# Patient Record
Sex: FEMALE | Race: WHITE | NOT HISPANIC OR LATINO | URBAN - METROPOLITAN AREA
[De-identification: names, ages, dates, MRNs, and addresses within clinical notes are randomized per-mention and may not be internally consistent; named-entity substitution may affect disease eponyms.]

---

## 2024-08-30 ENCOUNTER — OFFICE VISIT (OUTPATIENT)
Dept: PRIMARY CARE | Facility: CLINIC | Age: 29
End: 2024-08-30
Payer: COMMERCIAL

## 2024-08-30 VITALS
BODY MASS INDEX: 24.8 KG/M2 | OXYGEN SATURATION: 98 % | HEART RATE: 95 BPM | SYSTOLIC BLOOD PRESSURE: 124 MMHG | DIASTOLIC BLOOD PRESSURE: 74 MMHG | WEIGHT: 158 LBS | HEIGHT: 67 IN

## 2024-08-30 DIAGNOSIS — R79.89 ABNORMAL TSH: ICD-10-CM

## 2024-08-30 DIAGNOSIS — Z11.4 SCREENING FOR HIV (HUMAN IMMUNODEFICIENCY VIRUS): ICD-10-CM

## 2024-08-30 DIAGNOSIS — B00.1 RECURRENT COLD SORES: ICD-10-CM

## 2024-08-30 DIAGNOSIS — Z00.00 ANNUAL PHYSICAL EXAM: ICD-10-CM

## 2024-08-30 DIAGNOSIS — Z11.59 ENCOUNTER FOR HEPATITIS C SCREENING TEST FOR LOW RISK PATIENT: ICD-10-CM

## 2024-08-30 DIAGNOSIS — Z76.89 ENCOUNTER TO ESTABLISH CARE: Primary | ICD-10-CM

## 2024-08-30 DIAGNOSIS — Z13.1 SCREENING FOR DIABETES MELLITUS: ICD-10-CM

## 2024-08-30 DIAGNOSIS — Z13.220 SCREENING FOR LIPID DISORDERS: ICD-10-CM

## 2024-08-30 DIAGNOSIS — E55.9 VITAMIN D DEFICIENCY: ICD-10-CM

## 2024-08-30 PROCEDURE — 3008F BODY MASS INDEX DOCD: CPT

## 2024-08-30 PROCEDURE — 99385 PREV VISIT NEW AGE 18-39: CPT

## 2024-08-30 PROCEDURE — 1036F TOBACCO NON-USER: CPT

## 2024-08-30 RX ORDER — VALACYCLOVIR HYDROCHLORIDE 1 G/1
1000 TABLET, FILM COATED ORAL AS NEEDED
COMMUNITY

## 2024-08-30 RX ORDER — VALACYCLOVIR HYDROCHLORIDE 1 G/1
TABLET, FILM COATED ORAL
COMMUNITY
Start: 2024-06-12 | End: 2024-08-30 | Stop reason: ALTCHOICE

## 2024-08-30 RX ORDER — VALACYCLOVIR HYDROCHLORIDE 1 G/1
1000 TABLET, FILM COATED ORAL 2 TIMES DAILY
Qty: 4 TABLET | Refills: 5 | Status: SHIPPED | OUTPATIENT
Start: 2024-08-30 | End: 2024-08-30 | Stop reason: WASHOUT

## 2024-08-30 RX ORDER — VALACYCLOVIR HYDROCHLORIDE 500 MG/1
500 TABLET, FILM COATED ORAL AS NEEDED
COMMUNITY
End: 2024-08-30 | Stop reason: WASHOUT

## 2024-08-30 ASSESSMENT — ENCOUNTER SYMPTOMS
VOMITING: 0
FEVER: 0
MYALGIAS: 0
DIAPHORESIS: 0
HEMATURIA: 0
LIGHT-HEADEDNESS: 0
DYSURIA: 0
ADENOPATHY: 0
COUGH: 0
BLOOD IN STOOL: 0
DIFFICULTY URINATING: 0
ARTHRALGIAS: 0
NAUSEA: 0
FATIGUE: 0
WHEEZING: 0
SHORTNESS OF BREATH: 0
SORE THROAT: 0
DIARRHEA: 0
PALPITATIONS: 0

## 2024-08-30 ASSESSMENT — PAIN SCALES - GENERAL: PAINLEVEL: 0-NO PAIN

## 2024-08-30 ASSESSMENT — PATIENT HEALTH QUESTIONNAIRE - PHQ9
1. LITTLE INTEREST OR PLEASURE IN DOING THINGS: NOT AT ALL
SUM OF ALL RESPONSES TO PHQ9 QUESTIONS 1 AND 2: 0
2. FEELING DOWN, DEPRESSED OR HOPELESS: NOT AT ALL

## 2024-08-30 NOTE — PROGRESS NOTES
"Subjective   Patient ID: Anni Mccann is a 29 y.o. female who presents for Establish Care (Med refill valacyclovir /la//).    Hx of recurrent cold sores.     Moved to back to [place] from [place] after divorce.     Patient states she is recently . Patient stress was very high and since divorce has not had breakout.     Fhx: Patient test negative for BRCA gene, half sister had cervical cancer. +Stroke     HM: PAP 9/2023 in [facility]. Mammogram start 39. Colon screening start 44. Lung screening never smoker. DEXA start 64. Vaccinations Tdap 3/2024.         Review of Systems   Constitutional:  Negative for diaphoresis, fatigue and fever.   HENT:  Negative for congestion, hearing loss and sore throat.    Eyes:  Negative for visual disturbance.   Respiratory:  Negative for cough, shortness of breath and wheezing.    Cardiovascular:  Negative for chest pain, palpitations and leg swelling.   Gastrointestinal:  Negative for blood in stool, diarrhea, nausea and vomiting.   Genitourinary:  Negative for difficulty urinating, dysuria and hematuria.   Musculoskeletal:  Negative for arthralgias and myalgias.   Skin:  Negative for rash.   Allergic/Immunologic: Negative for immunocompromised state.   Neurological:  Negative for syncope and light-headedness.   Hematological:  Negative for adenopathy.   Psychiatric/Behavioral:  Negative for suicidal ideas.        Objective   /74   Pulse 95   Ht 1.702 m (5' 7\")   Wt 71.7 kg (158 lb)   SpO2 98%   BMI 24.75 kg/m²     Physical Exam  Constitutional:       General: She is not in acute distress.     Appearance: Normal appearance.   HENT:      Right Ear: Tympanic membrane and ear canal normal.      Left Ear: Tympanic membrane and ear canal normal.      Nose: Nose normal.      Mouth/Throat:      Mouth: Mucous membranes are moist.      Pharynx: Oropharynx is clear. No oropharyngeal exudate or posterior oropharyngeal erythema.   Eyes:      Extraocular Movements: Extraocular " movements intact.      Conjunctiva/sclera: Conjunctivae normal.      Pupils: Pupils are equal, round, and reactive to light.   Neck:      Thyroid: No thyroid mass or thyromegaly.   Cardiovascular:      Rate and Rhythm: Normal rate and regular rhythm.      Pulses: Normal pulses.      Heart sounds: No murmur heard.     No gallop.   Pulmonary:      Effort: Pulmonary effort is normal.      Breath sounds: No wheezing, rhonchi or rales.   Abdominal:      General: Bowel sounds are normal.      Palpations: Abdomen is soft. There is no hepatomegaly, splenomegaly or mass.      Tenderness: There is no abdominal tenderness. There is no guarding.   Musculoskeletal:         General: Normal range of motion.      Right lower leg: No edema.      Left lower leg: No edema.   Lymphadenopathy:      Cervical: No cervical adenopathy.   Skin:     General: Skin is warm.      Findings: No rash.   Neurological:      General: No focal deficit present.      Mental Status: She is alert.      Motor: Motor function is intact. No weakness.   Psychiatric:         Mood and Affect: Mood normal.         Thought Content: Thought content normal.         Assessment/Plan   Diagnoses and all orders for this visit:  Encounter to establish care  Recurrent cold sores  Annual physical exam  Abnormal TSH  -     TSH with reflex to Free T4 if abnormal; Future  Screening for diabetes mellitus  -     Comprehensive Metabolic Panel; Future  Screening for lipid disorders  -     Lipid Panel; Future  Encounter for hepatitis C screening test for low risk patient  -     Hepatitis C Antibody; Future  Vitamin D deficiency  -     Vitamin D 25-Hydroxy,Total (for eval of Vitamin D levels); Future  Screening for HIV (human immunodeficiency virus)  -     HIV 1/2 Antigen/Antibody Screen with Reflex to Confirmation; Future

## 2024-09-13 ENCOUNTER — TELEPHONE (OUTPATIENT)
Dept: PRIMARY CARE | Facility: CLINIC | Age: 29
End: 2024-09-13
Payer: COMMERCIAL

## 2024-09-13 ENCOUNTER — PATIENT MESSAGE (OUTPATIENT)
Dept: PRIMARY CARE | Facility: CLINIC | Age: 29
End: 2024-09-13
Payer: COMMERCIAL

## 2024-09-13 DIAGNOSIS — B00.1 RECURRENT COLD SORES: Primary | ICD-10-CM

## 2024-09-13 RX ORDER — VALACYCLOVIR HYDROCHLORIDE 1 G/1
1000 TABLET, FILM COATED ORAL 2 TIMES DAILY
Qty: 14 TABLET | Refills: 1 | Status: SHIPPED | OUTPATIENT
Start: 2024-09-13 | End: 2024-09-27

## 2024-09-13 NOTE — TELEPHONE ENCOUNTER
Unable to add message on [employer] message due to dennis taking the message. Pt will be taking valaciclovir for cold sores and as needed

## 2025-03-28 ENCOUNTER — TELEPHONE (OUTPATIENT)
Dept: OBSTETRICS AND GYNECOLOGY | Facility: CLINIC | Age: 30
End: 2025-03-28

## 2025-03-28 ENCOUNTER — OFFICE VISIT (OUTPATIENT)
Dept: OBSTETRICS AND GYNECOLOGY | Facility: CLINIC | Age: 30
End: 2025-03-28
Payer: COMMERCIAL

## 2025-03-28 VITALS
DIASTOLIC BLOOD PRESSURE: 74 MMHG | HEIGHT: 67 IN | BODY MASS INDEX: 24.96 KG/M2 | WEIGHT: 159 LBS | SYSTOLIC BLOOD PRESSURE: 110 MMHG

## 2025-03-28 DIAGNOSIS — Z01.419 ENCOUNTER FOR ANNUAL ROUTINE GYNECOLOGICAL EXAMINATION: Primary | ICD-10-CM

## 2025-03-28 DIAGNOSIS — Z30.09 BIRTH CONTROL COUNSELING: ICD-10-CM

## 2025-03-28 DIAGNOSIS — Z11.51 SCREENING FOR HUMAN PAPILLOMAVIRUS (HPV): ICD-10-CM

## 2025-03-28 DIAGNOSIS — Z30.014 ENCOUNTER FOR INITIAL PRESCRIPTION OF INTRAUTERINE CONTRACEPTIVE DEVICE (IUD): ICD-10-CM

## 2025-03-28 PROCEDURE — 1036F TOBACCO NON-USER: CPT

## 2025-03-28 PROCEDURE — 99385 PREV VISIT NEW AGE 18-39: CPT

## 2025-03-28 PROCEDURE — 3008F BODY MASS INDEX DOCD: CPT

## 2025-03-28 RX ORDER — NORGESTREL 0.07 MG/1
1 TABLET ORAL DAILY
COMMUNITY

## 2025-03-28 ASSESSMENT — ENCOUNTER SYMPTOMS
LOSS OF SENSATION IN FEET: 0
UNEXPECTED WEIGHT CHANGE: 0
DEPRESSION: 0
FATIGUE: 0
DIZZINESS: 0
CHILLS: 0
NAUSEA: 0
COUGH: 0
SHORTNESS OF BREATH: 0
HEADACHES: 0
COLOR CHANGE: 0
ABDOMINAL PAIN: 0
FEVER: 0
VOMITING: 0
OCCASIONAL FEELINGS OF UNSTEADINESS: 0
DYSURIA: 0

## 2025-03-28 ASSESSMENT — LIFESTYLE VARIABLES
HOW OFTEN DO YOU HAVE SIX OR MORE DRINKS ON ONE OCCASION: NEVER
HOW MANY STANDARD DRINKS CONTAINING ALCOHOL DO YOU HAVE ON A TYPICAL DAY: 1 OR 2
SKIP TO QUESTIONS 9-10: 1
HOW OFTEN DO YOU HAVE A DRINK CONTAINING ALCOHOL: 2-4 TIMES A MONTH
AUDIT-C TOTAL SCORE: 2

## 2025-03-28 ASSESSMENT — PATIENT HEALTH QUESTIONNAIRE - PHQ9
2. FEELING DOWN, DEPRESSED OR HOPELESS: NOT AT ALL
1. LITTLE INTEREST OR PLEASURE IN DOING THINGS: NOT AT ALL
SUM OF ALL RESPONSES TO PHQ9 QUESTIONS 1 & 2: 0

## 2025-03-28 ASSESSMENT — PAIN SCALES - GENERAL: PAINLEVEL_OUTOF10: 0-NO PAIN

## 2025-03-28 NOTE — TELEPHONE ENCOUNTER
Pt in office today request mirena. Consent faxed and patient will need something for anxiety with insertion per robert

## 2025-03-28 NOTE — PROGRESS NOTES
"Subjective   Anni Mccann is a 30 y.o. female who is here for a routine GYN exam as a new patient establishing care.  -Previous GYN care in [place].  Does not recall a history of abnormal Pap smears.   -Recalled history of genetic testing that was BRCA negative.  -Currently on the oh pills for about 4 to 5 months now; experiencing bleeding about every 2 weeks.  She desires another form of contraception.  She is most interested in a Mirena IUD as many friends have this form of birth control.  However she is significantly very nervous regarding medical procedures.  -Denies breast changes or vaginal concerns..     Complaints:   none  Periods: irregular on O pill  Dysmenorrhea:  none    Current contraception: O pill  History of abnormal Pap smear: no  History of abnormal mammogram: no      OB History          0    Para   0    Term   0       0    AB   0    Living   0         SAB   0    IAB   0    Ectopic   0    Multiple   0    Live Births   0                  Review of Systems   Constitutional:  Negative for chills, fatigue, fever and unexpected weight change.   Respiratory:  Negative for cough and shortness of breath.    Gastrointestinal:  Negative for abdominal pain, nausea and vomiting.   Genitourinary:  Negative for dyspareunia, dysuria, pelvic pain and vaginal discharge.   Skin:  Negative for color change and rash.   Neurological:  Negative for dizziness and headaches.       Objective   /74   Ht 1.689 m (5' 6.5\")   Wt 72.1 kg (159 lb)   LMP 2025 (Approximate)   BMI 25.28 kg/m²        General:   Alert and oriented, in no acute distress   Neck: Supple. No visible thyromegaly.    Breast/Axilla: Normal to palpation bilaterally without masses, skin changes, or nipple discharge.    Abdomen: Soft, non-tender, without masses or organomegaly   Vulva: Normal architecture without erythema, masses, or lesions.    Vagina: Normal mucosa without lesions, masses, or atrophy. No abnormal vaginal " discharge.    Cervix: Normal without masses, lesions, or signs of cervicitis; Pap performed   Uterus: Normal, mobile, non-enlarged uterus   Adnexa: Normal without masses or lesions   Pelvic Floor Normal    Psych Normal affect. Normal mood.      Assessment/Plan   Diagnoses and all orders for this visit:  Encounter for annual routine gynecological examination  -     THINPREP PAP TEST  Screening for human papillomavirus (HPV)  -     THINPREP PAP TEST  Birth control counseling   -Reviewed 3 broad categories of contraception and all associated options available.   -Reviewed associated bleeding patterns, effectiveness, and side effects.   -She is most interested in the Mirena IUD for long-acting birth control.  We reviewed the possibility of irregular unpredictable or absent bleeding with the Mirena IUD.  In-depth review of the insertion procedure.  She is very anxious regarding the procedure and requests PRN medication to help relax prior to the procedure - will pass this along to the provider she gets scheduled with.  Consent signed in office and plan to return for insertion procedure.  Encounter for initial prescription of intrauterine contraceptive device (IUD)   -Consent signed in office for Mirena IUD; plan to return when the device arrives for insertion.     Trinidad Minaya PA-C

## 2025-04-07 NOTE — TELEPHONE ENCOUNTER
Pt left message that [facility] has been trying to reach her for information regarding the mirena. Called patient back and left detailed message for her to call them and number given to patient

## 2025-04-10 LAB
CYTOLOGY CMNT CVX/VAG CYTO-IMP: NORMAL
HPV HR 12 DNA GENITAL QL NAA+PROBE: NEGATIVE
HPV HR GENOTYPES PNL CVX NAA+PROBE: NEGATIVE
HPV16 DNA SPEC QL NAA+PROBE: NEGATIVE
HPV18 DNA SPEC QL NAA+PROBE: NEGATIVE
LAB AP CONTRACEPTIVE HISTORY: NORMAL
LAB AP HPV GENOTYPE QUESTION: YES
LAB AP HPV HR: NORMAL
LABORATORY COMMENT REPORT: NORMAL
LMP START DATE: NORMAL
PATH REPORT.TOTAL CANCER: NORMAL

## 2025-04-23 NOTE — TELEPHONE ENCOUNTER
Pt is very anxious about insertion, are you comfortable with prescribing a medication PRN for insertion only? Please advise

## 2025-04-23 NOTE — TELEPHONE ENCOUNTER
Asppt scheduled, cytotec called to patients local pharmacy. Trinidad asked that I send this to you. Please read KB note about medication for anxiety about insertion.

## 2025-05-01 ENCOUNTER — TELEPHONE (OUTPATIENT)
Dept: OBSTETRICS AND GYNECOLOGY | Facility: CLINIC | Age: 30
End: 2025-05-01
Payer: COMMERCIAL

## 2025-05-01 ENCOUNTER — APPOINTMENT (OUTPATIENT)
Dept: OBSTETRICS AND GYNECOLOGY | Facility: CLINIC | Age: 30
End: 2025-05-01
Payer: COMMERCIAL

## 2025-05-01 DIAGNOSIS — F41.9 ANXIETY DUE TO INVASIVE PROCEDURE: Primary | ICD-10-CM

## 2025-05-01 RX ORDER — ALPRAZOLAM 0.25 MG/1
TABLET ORAL
Qty: 2 TABLET | Refills: 0 | Status: SHIPPED | OUTPATIENT
Start: 2025-05-01

## 2025-05-01 NOTE — PROGRESS NOTES
IUD Management    Performed by: Nohemy Velez MD  Authorized by: Nohemy Velez MD    Procedure: IUD insertion    Consent obtained by patient, parent, or legal power of  - including discussion of procedure risks and benefits, patient questions answered, and patient education provided: yes    Pregnancy risk comment:  Negative UPT in office  Date/Time of Insertion:  5/2/2025 10:20 AM  Speculum placed in vagina: yes    Cervix cleaned and prepped: yes    Tenaculum/Allis/Ring Forceps applied to cervix: yes    Uterus sound depth (cm):  7  IUD inserted without complications: yes    OSM: levonorgestrel 20 mcg/24hr  Strings trimmed to (cm):  3  Patient tolerated procedure well: yes    Estimated blood loss (mL): <5 mL.  Intended removal date: 8 years    Insertion comments: Patient tolerated IUD placement well without issue. Provided with educational pamphlet regarding Mirena. Advised to follow up in 4 weeks for a string check.

## 2025-05-01 NOTE — TELEPHONE ENCOUNTER
Patient is coming in tomorrow for IUD insertion, she would like anxiety meds called in. States she discussed with you and was advised to call before coming in.

## 2025-05-02 ENCOUNTER — PROCEDURE VISIT (OUTPATIENT)
Dept: OBSTETRICS AND GYNECOLOGY | Facility: CLINIC | Age: 30
End: 2025-05-02
Payer: COMMERCIAL

## 2025-05-02 VITALS
HEIGHT: 67 IN | WEIGHT: 148 LBS | SYSTOLIC BLOOD PRESSURE: 116 MMHG | BODY MASS INDEX: 23.23 KG/M2 | DIASTOLIC BLOOD PRESSURE: 74 MMHG

## 2025-05-02 DIAGNOSIS — Z30.430 ENCOUNTER FOR IUD INSERTION: Primary | ICD-10-CM

## 2025-05-02 LAB — PREGNANCY TEST URINE, POC: NEGATIVE

## 2025-05-02 PROCEDURE — 81025 URINE PREGNANCY TEST: CPT | Performed by: STUDENT IN AN ORGANIZED HEALTH CARE EDUCATION/TRAINING PROGRAM

## 2025-05-02 PROCEDURE — 2500000004 HC RX 250 GENERAL PHARMACY W/ HCPCS (ALT 636 FOR OP/ED): Performed by: STUDENT IN AN ORGANIZED HEALTH CARE EDUCATION/TRAINING PROGRAM

## 2025-05-02 PROCEDURE — 58300 INSERT INTRAUTERINE DEVICE: CPT | Performed by: STUDENT IN AN ORGANIZED HEALTH CARE EDUCATION/TRAINING PROGRAM

## 2025-05-02 RX ADMIN — LEVONORGESTREL 52 MG: 52 INTRAUTERINE DEVICE INTRAUTERINE at 10:15

## 2025-05-02 ASSESSMENT — LIFESTYLE VARIABLES
HOW MANY STANDARD DRINKS CONTAINING ALCOHOL DO YOU HAVE ON A TYPICAL DAY: 1 OR 2
HOW OFTEN DO YOU HAVE A DRINK CONTAINING ALCOHOL: 2-4 TIMES A MONTH

## 2025-05-02 ASSESSMENT — PATIENT HEALTH QUESTIONNAIRE - PHQ9
SUM OF ALL RESPONSES TO PHQ9 QUESTIONS 1 & 2: 0
1. LITTLE INTEREST OR PLEASURE IN DOING THINGS: NOT AT ALL
2. FEELING DOWN, DEPRESSED OR HOPELESS: NOT AT ALL

## 2025-05-02 ASSESSMENT — PAIN SCALES - GENERAL: PAINLEVEL_OUTOF10: 0-NO PAIN

## 2025-05-02 ASSESSMENT — ENCOUNTER SYMPTOMS
LOSS OF SENSATION IN FEET: 0
DEPRESSION: 0
OCCASIONAL FEELINGS OF UNSTEADINESS: 0

## 2025-05-30 NOTE — PROGRESS NOTES
"GYNECOLOGY OFFICE VISIT   Subjective     History Of Present Illness:    Anni Mccann \"Russ" is a 30 y.o.  presenting for IUD string check after Mirena placement on 25.    Reports some manageable cramping since placement that improves with Tylenol/Motrin. Has has a period since placement. Otherwise no complaints.    Menstrual History:  OB History          0    Para   0    Term   0       0    AB   0    Living   0         SAB   0    IAB   0    Ectopic   0    Multiple   0    Live Births   0                Patient's last menstrual period was 2025 (approximate).     Last Pap Smear:  Result Date Procedure Results Follow-ups   3/28/2025 THINPREP PAP TEST Full result values not displayed (5): See result report for details  Perform HPV HR test?: Always (all interpretations)  Include HPV Genotype?: Yes  LMP: 3/13/2025      Past Medical History:  Medical History[1]    Past Surgical History:  Surgical History[2]      Social History:  Social History     Tobacco Use    Smoking status: Never    Smokeless tobacco: Never   Substance Use Topics    Alcohol use: Yes     Alcohol/week: 3.0 standard drinks of alcohol     Types: 3 Standard drinks or equivalent per week       Family History:  Family History[3]     Allergies:  Amoxicillin    Outpatient Medications:  Current Outpatient Medications   Medication Instructions    ALPRAZolam (Xanax) 0.25 mg tablet Take 1 tablet by mouth once at bedtime the night prior to procedure and 1 tablet by mouth once 1-2 hours prior to procedure.    levonorgestrel (Mirena) 20 mcg/24hr IUD 1 each, Once    norgestrel (Opill) 0.075 mg tablet 1 tablet, Daily    valACYclovir (VALTREX) 500 mg, Daily PRN     Objective   Last Recorded Vitals:  Visit Vitals  BP 99/67     Physical Exam:  General: Alert and oriented, in no acute distress.  Psych: Normal affect and mood. Able to answer questions appropriately.   Lungs: Non labored respirations.  GYN:  Speculum:  Vulva: Normal " architecture without erythema, masses, or lesions.   Vagina: Normal moist mucosa without lesions, masses, or atrophy. No abnormal vaginal discharge.   Cervix: Normal without erythema, masses, lesions, or signs of cervicitis. IUD strings seen.    Assessment/Plan     30 y.o.  , here for IUD string check  Assessment & Plan  Intrauterine device surveillance  -S/p Mirena placement on 25  -IUD strings seen  -Happy with IUD and desires continuation  -Follow up for annual Gyn care, sooner as clinically indicated         Nohemy Velez MD       [1]   Past Medical History:  Diagnosis Date    Eczema 2024   [2]   Past Surgical History:  Procedure Laterality Date    WISDOM TOOTH EXTRACTION     [3]   Family History  Problem Relation Name Age of Onset    Skin cancer Mother      Hearing loss Father Maycol Mccann     Skin cancer Father Maycol Mccann     Transient ischemic attack Father Maycol Mccann     Stroke Father Mayocl Mccann     Cervical cancer Sister      Stroke Maternal Grandmother Alejandra Candelario     Ovarian cancer Sister Haleigh     Breast cancer Neg Hx      Uterine cancer Neg Hx      Colon cancer Neg Hx      Prostate cancer Neg Hx      Diabetes Neg Hx      Heart attack Neg Hx

## 2025-06-02 ENCOUNTER — OFFICE VISIT (OUTPATIENT)
Dept: OBSTETRICS AND GYNECOLOGY | Facility: CLINIC | Age: 30
End: 2025-06-02
Payer: COMMERCIAL

## 2025-06-02 VITALS
DIASTOLIC BLOOD PRESSURE: 67 MMHG | SYSTOLIC BLOOD PRESSURE: 99 MMHG | HEIGHT: 67 IN | BODY MASS INDEX: 22.91 KG/M2 | WEIGHT: 146 LBS

## 2025-06-02 DIAGNOSIS — Z30.431 INTRAUTERINE DEVICE SURVEILLANCE: Primary | ICD-10-CM

## 2025-06-02 PROCEDURE — 3008F BODY MASS INDEX DOCD: CPT | Performed by: STUDENT IN AN ORGANIZED HEALTH CARE EDUCATION/TRAINING PROGRAM

## 2025-06-02 PROCEDURE — 1036F TOBACCO NON-USER: CPT | Performed by: STUDENT IN AN ORGANIZED HEALTH CARE EDUCATION/TRAINING PROGRAM

## 2025-06-02 PROCEDURE — 99212 OFFICE O/P EST SF 10 MIN: CPT | Performed by: STUDENT IN AN ORGANIZED HEALTH CARE EDUCATION/TRAINING PROGRAM

## 2025-06-02 RX ORDER — VALACYCLOVIR HYDROCHLORIDE 500 MG/1
500 TABLET, FILM COATED ORAL DAILY PRN
COMMUNITY

## 2025-06-02 RX ORDER — LEVONORGESTREL 52 MG/1
1 INTRAUTERINE DEVICE INTRAUTERINE ONCE
COMMUNITY

## 2025-06-02 ASSESSMENT — PAIN SCALES - GENERAL: PAINLEVEL_OUTOF10: 0-NO PAIN

## 2025-06-02 ASSESSMENT — ENCOUNTER SYMPTOMS
OCCASIONAL FEELINGS OF UNSTEADINESS: 0
DEPRESSION: 0
LOSS OF SENSATION IN FEET: 0

## 2025-06-02 ASSESSMENT — PATIENT HEALTH QUESTIONNAIRE - PHQ9
1. LITTLE INTEREST OR PLEASURE IN DOING THINGS: NOT AT ALL
2. FEELING DOWN, DEPRESSED OR HOPELESS: NOT AT ALL
SUM OF ALL RESPONSES TO PHQ9 QUESTIONS 1 & 2: 0

## 2025-07-01 ENCOUNTER — OFFICE VISIT (OUTPATIENT)
Dept: PRIMARY CARE | Facility: CLINIC | Age: 30
End: 2025-07-01
Payer: COMMERCIAL

## 2025-07-01 VITALS
BODY MASS INDEX: 23.67 KG/M2 | SYSTOLIC BLOOD PRESSURE: 100 MMHG | WEIGHT: 150.8 LBS | HEART RATE: 92 BPM | HEIGHT: 67 IN | OXYGEN SATURATION: 99 % | DIASTOLIC BLOOD PRESSURE: 62 MMHG

## 2025-07-01 DIAGNOSIS — J02.0 STREP PHARYNGITIS: ICD-10-CM

## 2025-07-01 DIAGNOSIS — B00.1 RECURRENT COLD SORES: ICD-10-CM

## 2025-07-01 DIAGNOSIS — J02.9 SORE THROAT: Primary | ICD-10-CM

## 2025-07-01 LAB — POC STREP A RESULT: POSITIVE

## 2025-07-01 PROCEDURE — 3008F BODY MASS INDEX DOCD: CPT

## 2025-07-01 PROCEDURE — 87651 STREP A DNA AMP PROBE: CPT

## 2025-07-01 PROCEDURE — 99213 OFFICE O/P EST LOW 20 MIN: CPT

## 2025-07-01 PROCEDURE — 1036F TOBACCO NON-USER: CPT

## 2025-07-01 RX ORDER — CEFDINIR 300 MG/1
300 CAPSULE ORAL 2 TIMES DAILY
Qty: 20 CAPSULE | Refills: 0 | Status: SHIPPED | OUTPATIENT
Start: 2025-07-01 | End: 2025-07-11

## 2025-07-01 RX ORDER — VALACYCLOVIR HYDROCHLORIDE 500 MG/1
500 TABLET, FILM COATED ORAL DAILY PRN
Qty: 30 TABLET | Refills: 1 | Status: SHIPPED | OUTPATIENT
Start: 2025-07-01

## 2025-07-01 ASSESSMENT — PATIENT HEALTH QUESTIONNAIRE - PHQ9
2. FEELING DOWN, DEPRESSED OR HOPELESS: NOT AT ALL
1. LITTLE INTEREST OR PLEASURE IN DOING THINGS: NOT AT ALL
SUM OF ALL RESPONSES TO PHQ9 QUESTIONS 1 AND 2: 0

## 2025-07-01 ASSESSMENT — ENCOUNTER SYMPTOMS
HOARSE VOICE: 1
TROUBLE SWALLOWING: 1
HEADACHES: 0
ARTHRALGIAS: 1
FEVER: 0
FATIGUE: 1
SORE THROAT: 1
SWOLLEN GLANDS: 1
SHORTNESS OF BREATH: 0
CHILLS: 0
ABDOMINAL PAIN: 0
COUGH: 0
STRIDOR: 0
NECK PAIN: 1
VOMITING: 0
DIARRHEA: 0

## 2025-07-01 ASSESSMENT — PAIN SCALES - GENERAL: PAINLEVEL_OUTOF10: 5

## 2025-07-01 NOTE — PROGRESS NOTES
"Subjective   Patient ID: Mario Mccann is a 30 y.o. female who presents for Sore Throat (Swollen tonsils).    Sore Throat   This is a new problem. The current episode started yesterday (patient has strep about2-3 months ago). The problem has been gradually worsening. Neither side of throat is experiencing more pain than the other. There has been no fever. The fever has been present for Less than 1 day. The pain is at a severity of 3/10. The pain is mild. Associated symptoms include a hoarse voice, neck pain, swollen glands and trouble swallowing. Pertinent negatives include no abdominal pain, congestion, coughing, diarrhea, drooling, ear discharge, ear pain, headaches, plugged ear sensation, shortness of breath, stridor or vomiting.        Review of Systems   Constitutional:  Positive for fatigue. Negative for chills and fever.   HENT:  Positive for hoarse voice, sore throat and trouble swallowing. Negative for congestion, drooling, ear discharge and ear pain.    Respiratory:  Negative for cough, shortness of breath and stridor.    Gastrointestinal:  Negative for abdominal pain, diarrhea and vomiting.   Musculoskeletal:  Positive for arthralgias and neck pain.   Neurological:  Negative for headaches.       Objective   /62   Pulse 92   Ht 1.702 m (5' 7\")   Wt 68.4 kg (150 lb 12.8 oz)   LMP 05/29/2025 (Approximate)   SpO2 99%   BMI 23.62 kg/m²     Physical Exam  Constitutional:       Appearance: Normal appearance.   HENT:      Right Ear: Tympanic membrane and ear canal normal.      Left Ear: Tympanic membrane and ear canal normal.      Nose: No congestion or rhinorrhea.      Right Turbinates: Not enlarged, swollen or pale.      Left Turbinates: Not enlarged, swollen or pale.      Right Sinus: No maxillary sinus tenderness or frontal sinus tenderness.      Left Sinus: No maxillary sinus tenderness or frontal sinus tenderness.      Mouth/Throat:      Mouth: Mucous membranes are moist. No oral lesions.    "   Pharynx: Uvula midline. Pharyngeal swelling, oropharyngeal exudate and posterior oropharyngeal erythema present.      Tonsils: Tonsillar exudate present.   Eyes:      Conjunctiva/sclera: Conjunctivae normal.   Cardiovascular:      Rate and Rhythm: Normal rate and regular rhythm.      Heart sounds: No murmur heard.  Pulmonary:      Effort: Pulmonary effort is normal.      Breath sounds: Normal breath sounds. No wheezing, rhonchi or rales.   Lymphadenopathy:      Cervical: Cervical adenopathy (left worse) present.   Skin:     General: Skin is warm and dry.   Neurological:      Mental Status: She is alert.   Psychiatric:         Mood and Affect: Mood and affect normal.         Assessment/Plan   Diagnoses and all orders for this visit:  Sore throat  -     POCT NOW STREP A manually resulted  Strep pharyngitis  -     cefdinir (Omnicef) 300 mg capsule; Take 1 capsule (300 mg) by mouth 2 times a day for 10 days.  Recurrent cold sores  -     valACYclovir (Valtrex) 500 mg tablet; Take 1 tablet (500 mg) by mouth once daily as needed (for recurrent cold sores).  Call if not improving

## 2025-08-26 ASSESSMENT — PROMIS GLOBAL HEALTH SCALE
CARRYOUT_SOCIAL_ACTIVITIES: EXCELLENT
EMOTIONAL_PROBLEMS: RARELY
RATE_GENERAL_HEALTH: EXCELLENT
CARRYOUT_PHYSICAL_ACTIVITIES: COMPLETELY
RATE_SOCIAL_SATISFACTION: EXCELLENT
RATE_AVERAGE_PAIN: 6
RATE_MENTAL_HEALTH: EXCELLENT
RATE_QUALITY_OF_LIFE: VERY GOOD
RATE_AVERAGE_FATIGUE: MILD
RATE_PHYSICAL_HEALTH: EXCELLENT

## 2025-09-02 ENCOUNTER — OFFICE VISIT (OUTPATIENT)
Dept: PRIMARY CARE | Facility: CLINIC | Age: 30
End: 2025-09-02
Payer: COMMERCIAL

## 2025-09-02 ENCOUNTER — HOSPITAL ENCOUNTER (OUTPATIENT)
Dept: RADIOLOGY | Facility: CLINIC | Age: 30
Discharge: HOME | End: 2025-09-02
Payer: COMMERCIAL

## 2025-09-02 VITALS
WEIGHT: 148 LBS | BODY MASS INDEX: 23.18 KG/M2 | HEART RATE: 80 BPM | SYSTOLIC BLOOD PRESSURE: 102 MMHG | OXYGEN SATURATION: 98 % | DIASTOLIC BLOOD PRESSURE: 70 MMHG

## 2025-09-02 DIAGNOSIS — Z13.1 SCREENING FOR DIABETES MELLITUS: ICD-10-CM

## 2025-09-02 DIAGNOSIS — Z11.59 ENCOUNTER FOR HEPATITIS C SCREENING TEST FOR LOW RISK PATIENT: ICD-10-CM

## 2025-09-02 DIAGNOSIS — M53.3 SI (SACROILIAC) JOINT DYSFUNCTION: ICD-10-CM

## 2025-09-02 DIAGNOSIS — Z11.4 SCREENING FOR HIV (HUMAN IMMUNODEFICIENCY VIRUS): ICD-10-CM

## 2025-09-02 DIAGNOSIS — Z00.00 ANNUAL PHYSICAL EXAM: Primary | ICD-10-CM

## 2025-09-02 DIAGNOSIS — Z13.220 SCREENING FOR LIPID DISORDERS: ICD-10-CM

## 2025-09-02 DIAGNOSIS — E55.9 VITAMIN D DEFICIENCY: ICD-10-CM

## 2025-09-02 DIAGNOSIS — R79.89 ABNORMAL TSH: ICD-10-CM

## 2025-09-02 PROCEDURE — 72202 X-RAY EXAM SI JOINTS 3/> VWS: CPT

## 2025-09-02 PROCEDURE — 99395 PREV VISIT EST AGE 18-39: CPT

## 2025-09-02 PROCEDURE — 1036F TOBACCO NON-USER: CPT

## 2025-09-02 PROCEDURE — 99213 OFFICE O/P EST LOW 20 MIN: CPT

## 2025-09-02 PROCEDURE — 72202 X-RAY EXAM SI JOINTS 3/> VWS: CPT | Performed by: RADIOLOGY

## 2025-09-02 ASSESSMENT — ENCOUNTER SYMPTOMS
DIARRHEA: 0
HEMATURIA: 0
NAUSEA: 0
VOMITING: 0
FATIGUE: 0
COUGH: 0
SHORTNESS OF BREATH: 0
ADENOPATHY: 0
DYSURIA: 0
DIAPHORESIS: 0
LIGHT-HEADEDNESS: 0
BLOOD IN STOOL: 0
ARTHRALGIAS: 1
DIFFICULTY URINATING: 0
FEVER: 0
PALPITATIONS: 0
SORE THROAT: 0
BACK PAIN: 1
WHEEZING: 0

## 2025-09-02 ASSESSMENT — PATIENT HEALTH QUESTIONNAIRE - PHQ9
2. FEELING DOWN, DEPRESSED OR HOPELESS: NOT AT ALL
SUM OF ALL RESPONSES TO PHQ9 QUESTIONS 1 AND 2: 0
1. LITTLE INTEREST OR PLEASURE IN DOING THINGS: NOT AT ALL

## 2025-09-02 ASSESSMENT — PAIN SCALES - GENERAL: PAINLEVEL_OUTOF10: 2

## 2025-09-03 ENCOUNTER — RESULTS FOLLOW-UP (OUTPATIENT)
Dept: PRIMARY CARE | Facility: CLINIC | Age: 30
End: 2025-09-03
Payer: COMMERCIAL